# Patient Record
Sex: FEMALE | Race: BLACK OR AFRICAN AMERICAN | NOT HISPANIC OR LATINO | Employment: STUDENT | URBAN - METROPOLITAN AREA
[De-identification: names, ages, dates, MRNs, and addresses within clinical notes are randomized per-mention and may not be internally consistent; named-entity substitution may affect disease eponyms.]

---
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
## 2020-01-02 ENCOUNTER — OFFICE VISIT (OUTPATIENT)
Dept: FAMILY MEDICINE | Facility: CLINIC | Age: 20
End: 2020-01-02
Payer: OTHER GOVERNMENT

## 2020-01-02 ENCOUNTER — LAB VISIT (OUTPATIENT)
Dept: LAB | Facility: HOSPITAL | Age: 20
End: 2020-01-02
Attending: INTERNAL MEDICINE
Payer: OTHER GOVERNMENT

## 2020-01-02 VITALS
DIASTOLIC BLOOD PRESSURE: 82 MMHG | SYSTOLIC BLOOD PRESSURE: 118 MMHG | WEIGHT: 145.06 LBS | OXYGEN SATURATION: 98 % | HEART RATE: 75 BPM | BODY MASS INDEX: 23.41 KG/M2

## 2020-01-02 DIAGNOSIS — Z00.00 ANNUAL PHYSICAL EXAM: ICD-10-CM

## 2020-01-02 DIAGNOSIS — Z00.00 ANNUAL PHYSICAL EXAM: Primary | ICD-10-CM

## 2020-01-02 LAB
BACTERIA #/AREA URNS HPF: ABNORMAL /HPF
BILIRUB UR QL STRIP: NEGATIVE
CLARITY UR: CLEAR
COLOR UR: YELLOW
GLUCOSE UR QL STRIP: NEGATIVE
HGB UR QL STRIP: ABNORMAL
KETONES UR QL STRIP: NEGATIVE
LEUKOCYTE ESTERASE UR QL STRIP: NEGATIVE
MICROSCOPIC COMMENT: ABNORMAL
NITRITE UR QL STRIP: NEGATIVE
PH UR STRIP: 6 [PH] (ref 5–8)
PROT UR QL STRIP: ABNORMAL
RBC #/AREA URNS HPF: 3 /HPF (ref 0–4)
SP GR UR STRIP: >=1.03 (ref 1–1.03)
SQUAMOUS #/AREA URNS HPF: 7 /HPF
URN SPEC COLLECT METH UR: ABNORMAL
WBC #/AREA URNS HPF: 2 /HPF (ref 0–5)

## 2020-01-02 PROCEDURE — 81000 URINALYSIS NONAUTO W/SCOPE: CPT | Mod: PO

## 2020-01-02 PROCEDURE — 99395 PREV VISIT EST AGE 18-39: CPT | Mod: S$PBB,,, | Performed by: INTERNAL MEDICINE

## 2020-01-02 PROCEDURE — 99999 PR PBB SHADOW E&M-EST. PATIENT-LVL II: ICD-10-PCS | Mod: PBBFAC,,, | Performed by: INTERNAL MEDICINE

## 2020-01-02 PROCEDURE — 99212 OFFICE O/P EST SF 10 MIN: CPT | Mod: PBBFAC,PO | Performed by: INTERNAL MEDICINE

## 2020-01-02 PROCEDURE — 99999 PR PBB SHADOW E&M-EST. PATIENT-LVL II: CPT | Mod: PBBFAC,,, | Performed by: INTERNAL MEDICINE

## 2020-01-02 PROCEDURE — 99395 PR PREVENTIVE VISIT,EST,18-39: ICD-10-PCS | Mod: S$PBB,,, | Performed by: INTERNAL MEDICINE

## 2020-01-02 PROCEDURE — 90686 IIV4 VACC NO PRSV 0.5 ML IM: CPT | Mod: PBBFAC,PO

## 2020-01-02 NOTE — PROGRESS NOTES
Subjective:       Patient ID: Shelbi Arciniega is a 19 y.o. female.    Chief Complaint: Annual Exam    Pt here for wellness visit.  Would like a flu shot.  She does not smoke.  She exercises periodically. Never had blood work done.      Review of Systems   Constitutional: Negative for fatigue, fever and unexpected weight change.   HENT: Negative for congestion, postnasal drip, sinus pain and sore throat.    Eyes: Negative for visual disturbance.   Respiratory: Negative for cough, chest tightness, shortness of breath and wheezing.    Cardiovascular: Negative for chest pain, palpitations and leg swelling.   Gastrointestinal: Negative for abdominal pain, blood in stool, constipation, diarrhea, nausea and vomiting.   Endocrine: Negative for cold intolerance and heat intolerance.   Genitourinary: Negative for difficulty urinating, dysuria and frequency.   Musculoskeletal: Negative for back pain, joint swelling and myalgias.   Skin: Negative for rash.   Allergic/Immunologic: Negative for environmental allergies.   Neurological: Negative for dizziness, seizures, numbness and headaches.   Hematological: Does not bruise/bleed easily.   Psychiatric/Behavioral: Negative for agitation and sleep disturbance.       Objective:      Physical Exam   Constitutional: She is oriented to person, place, and time. She appears well-developed and well-nourished.   HENT:   Head: Normocephalic and atraumatic.   Mouth/Throat: Oropharynx is clear and moist.   Eyes: Pupils are equal, round, and reactive to light. Conjunctivae and EOM are normal.   Neck: Normal range of motion. Neck supple. No thyromegaly present.   Cardiovascular: Normal rate, regular rhythm, normal heart sounds and intact distal pulses. Exam reveals no gallop and no friction rub.   No murmur heard.  Pulmonary/Chest: Effort normal and breath sounds normal. No respiratory distress. She has no wheezes. She has no rales.   Abdominal: Soft. Bowel sounds are normal. She  exhibits no distension. There is no tenderness.   Musculoskeletal: Normal range of motion. She exhibits no edema.   Lymphadenopathy:     She has no cervical adenopathy.   Neurological: She is alert and oriented to person, place, and time. No cranial nerve deficit.   Skin: Skin is warm and dry.   Psychiatric: She has a normal mood and affect. Her behavior is normal.       Assessment:       1. Annual physical exam        Plan:       Annual-- check labs . Encouraged exercising

## 2021-01-04 ENCOUNTER — PATIENT MESSAGE (OUTPATIENT)
Dept: ADMINISTRATIVE | Facility: HOSPITAL | Age: 21
End: 2021-01-04

## 2021-04-06 ENCOUNTER — PATIENT MESSAGE (OUTPATIENT)
Dept: ADMINISTRATIVE | Facility: HOSPITAL | Age: 21
End: 2021-04-06

## 2021-05-21 ENCOUNTER — OFFICE VISIT (OUTPATIENT)
Dept: OBSTETRICS AND GYNECOLOGY | Facility: CLINIC | Age: 21
End: 2021-05-21
Payer: OTHER GOVERNMENT

## 2021-05-21 VITALS
WEIGHT: 176.13 LBS | BODY MASS INDEX: 28.43 KG/M2 | DIASTOLIC BLOOD PRESSURE: 90 MMHG | SYSTOLIC BLOOD PRESSURE: 118 MMHG

## 2021-05-21 DIAGNOSIS — Z97.5 IUD (INTRAUTERINE DEVICE) IN PLACE: Primary | ICD-10-CM

## 2021-05-21 DIAGNOSIS — N92.1 BREAKTHROUGH BLEEDING WITH IUD: ICD-10-CM

## 2021-05-21 DIAGNOSIS — Z97.5 BREAKTHROUGH BLEEDING WITH IUD: ICD-10-CM

## 2021-05-21 DIAGNOSIS — Z30.014 ENCOUNTER FOR INITIAL PRESCRIPTION OF INTRAUTERINE CONTRACEPTIVE DEVICE: ICD-10-CM

## 2021-05-21 LAB
B-HCG UR QL: NEGATIVE
CTP QC/QA: YES

## 2021-05-21 PROCEDURE — 99202 PR OFFICE/OUTPT VISIT, NEW, LEVL II, 15-29 MIN: ICD-10-PCS | Mod: S$PBB,,, | Performed by: NURSE PRACTITIONER

## 2021-05-21 PROCEDURE — 99213 OFFICE O/P EST LOW 20 MIN: CPT | Mod: PBBFAC,PN | Performed by: NURSE PRACTITIONER

## 2021-05-21 PROCEDURE — 81025 URINE PREGNANCY TEST: CPT | Mod: PBBFAC,PN | Performed by: NURSE PRACTITIONER

## 2021-05-21 PROCEDURE — 99999 PR PBB SHADOW E&M-EST. PATIENT-LVL III: ICD-10-PCS | Mod: PBBFAC,,, | Performed by: NURSE PRACTITIONER

## 2021-05-21 PROCEDURE — 99202 OFFICE O/P NEW SF 15 MIN: CPT | Mod: S$PBB,,, | Performed by: NURSE PRACTITIONER

## 2021-05-21 PROCEDURE — 99999 PR PBB SHADOW E&M-EST. PATIENT-LVL III: CPT | Mod: PBBFAC,,, | Performed by: NURSE PRACTITIONER

## 2021-05-21 RX ORDER — LEVONORGESTREL 13.5 MG/1
1 INTRAUTERINE DEVICE INTRAUTERINE ONCE
COMMUNITY
End: 2021-07-19

## 2021-05-28 ENCOUNTER — PATIENT MESSAGE (OUTPATIENT)
Dept: OBSTETRICS AND GYNECOLOGY | Facility: CLINIC | Age: 21
End: 2021-05-28

## 2021-06-04 ENCOUNTER — TELEPHONE (OUTPATIENT)
Dept: OBSTETRICS AND GYNECOLOGY | Facility: CLINIC | Age: 21
End: 2021-06-04

## 2021-07-07 ENCOUNTER — PATIENT MESSAGE (OUTPATIENT)
Dept: ADMINISTRATIVE | Facility: HOSPITAL | Age: 21
End: 2021-07-07

## 2021-07-19 ENCOUNTER — PROCEDURE VISIT (OUTPATIENT)
Dept: OBSTETRICS AND GYNECOLOGY | Facility: CLINIC | Age: 21
End: 2021-07-19
Payer: OTHER GOVERNMENT

## 2021-07-19 VITALS
HEIGHT: 66 IN | BODY MASS INDEX: 27.32 KG/M2 | WEIGHT: 170 LBS | DIASTOLIC BLOOD PRESSURE: 82 MMHG | SYSTOLIC BLOOD PRESSURE: 118 MMHG

## 2021-07-19 DIAGNOSIS — Z30.432 ENCOUNTER FOR REMOVAL OF INTRAUTERINE CONTRACEPTIVE DEVICE (IUD): Primary | ICD-10-CM

## 2021-07-19 PROCEDURE — 58301 REMOVE INTRAUTERINE DEVICE: CPT | Mod: PBBFAC,PN | Performed by: NURSE PRACTITIONER

## 2021-07-19 PROCEDURE — 58301 REMOVAL OF IUD: ICD-10-PCS | Mod: S$PBB,,, | Performed by: NURSE PRACTITIONER

## 2021-07-19 PROCEDURE — 58301 REMOVE INTRAUTERINE DEVICE: CPT | Mod: S$PBB,,, | Performed by: NURSE PRACTITIONER

## 2021-10-29 ENCOUNTER — TELEPHONE (OUTPATIENT)
Dept: OBSTETRICS AND GYNECOLOGY | Facility: CLINIC | Age: 21
End: 2021-10-29
Payer: MEDICAID

## 2021-11-08 ENCOUNTER — OFFICE VISIT (OUTPATIENT)
Dept: OBSTETRICS AND GYNECOLOGY | Facility: CLINIC | Age: 21
End: 2021-11-08
Payer: OTHER GOVERNMENT

## 2021-11-08 VITALS
BODY MASS INDEX: 28.06 KG/M2 | HEIGHT: 66 IN | DIASTOLIC BLOOD PRESSURE: 76 MMHG | SYSTOLIC BLOOD PRESSURE: 112 MMHG | WEIGHT: 174.63 LBS

## 2021-11-08 DIAGNOSIS — Z32.01 POSITIVE PREGNANCY TEST: Primary | ICD-10-CM

## 2021-11-08 DIAGNOSIS — O26.849 UTERINE SIZE DATE DISCREPANCY PREGNANCY, UNSPECIFIED TRIMESTER: ICD-10-CM

## 2021-11-08 DIAGNOSIS — Z12.4 CERVICAL CANCER SCREENING: ICD-10-CM

## 2021-11-08 LAB
B-HCG UR QL: POSITIVE
CTP QC/QA: YES

## 2021-11-08 PROCEDURE — 87491 CHLMYD TRACH DNA AMP PROBE: CPT | Performed by: NURSE PRACTITIONER

## 2021-11-08 PROCEDURE — 87591 N.GONORRHOEAE DNA AMP PROB: CPT | Performed by: NURSE PRACTITIONER

## 2021-11-08 PROCEDURE — 88175 CYTOPATH C/V AUTO FLUID REDO: CPT | Performed by: NURSE PRACTITIONER

## 2021-11-08 PROCEDURE — 81025 URINE PREGNANCY TEST: CPT | Mod: PBBFAC,PN | Performed by: NURSE PRACTITIONER

## 2021-11-08 PROCEDURE — 87088 URINE BACTERIA CULTURE: CPT | Performed by: NURSE PRACTITIONER

## 2021-11-08 PROCEDURE — 99212 OFFICE O/P EST SF 10 MIN: CPT | Mod: PBBFAC,PN | Performed by: NURSE PRACTITIONER

## 2021-11-08 PROCEDURE — 87077 CULTURE AEROBIC IDENTIFY: CPT | Performed by: NURSE PRACTITIONER

## 2021-11-08 PROCEDURE — 87086 URINE CULTURE/COLONY COUNT: CPT | Performed by: NURSE PRACTITIONER

## 2021-11-08 PROCEDURE — 99999 PR PBB SHADOW E&M-EST. PATIENT-LVL II: ICD-10-PCS | Mod: PBBFAC,,, | Performed by: NURSE PRACTITIONER

## 2021-11-08 PROCEDURE — 99214 OFFICE O/P EST MOD 30 MIN: CPT | Mod: S$PBB,,, | Performed by: NURSE PRACTITIONER

## 2021-11-08 PROCEDURE — 99214 PR OFFICE/OUTPT VISIT, EST, LEVL IV, 30-39 MIN: ICD-10-PCS | Mod: S$PBB,,, | Performed by: NURSE PRACTITIONER

## 2021-11-08 PROCEDURE — 99999 PR PBB SHADOW E&M-EST. PATIENT-LVL II: CPT | Mod: PBBFAC,,, | Performed by: NURSE PRACTITIONER

## 2021-11-08 PROCEDURE — 87186 SC STD MICRODIL/AGAR DIL: CPT | Performed by: NURSE PRACTITIONER

## 2021-11-09 ENCOUNTER — LAB VISIT (OUTPATIENT)
Dept: LAB | Facility: HOSPITAL | Age: 21
End: 2021-11-09
Attending: NURSE PRACTITIONER
Payer: MEDICAID

## 2021-11-09 DIAGNOSIS — Z32.01 POSITIVE PREGNANCY TEST: ICD-10-CM

## 2021-11-09 LAB
ESTIMATED AVG GLUCOSE: 97 MG/DL (ref 68–131)
HBA1C MFR BLD: 5 % (ref 4–5.6)

## 2021-11-09 PROCEDURE — 86592 SYPHILIS TEST NON-TREP QUAL: CPT | Performed by: NURSE PRACTITIONER

## 2021-11-09 PROCEDURE — 80074 ACUTE HEPATITIS PANEL: CPT | Performed by: NURSE PRACTITIONER

## 2021-11-09 PROCEDURE — 87389 HIV-1 AG W/HIV-1&-2 AB AG IA: CPT | Performed by: NURSE PRACTITIONER

## 2021-11-09 PROCEDURE — 83020 HEMOGLOBIN ELECTROPHORESIS: CPT | Performed by: NURSE PRACTITIONER

## 2021-11-09 PROCEDURE — 85025 COMPLETE CBC W/AUTO DIFF WBC: CPT | Performed by: NURSE PRACTITIONER

## 2021-11-09 PROCEDURE — 86762 RUBELLA ANTIBODY: CPT | Performed by: NURSE PRACTITIONER

## 2021-11-09 PROCEDURE — 83036 HEMOGLOBIN GLYCOSYLATED A1C: CPT | Performed by: NURSE PRACTITIONER

## 2021-11-09 PROCEDURE — 86900 BLOOD TYPING SEROLOGIC ABO: CPT | Performed by: NURSE PRACTITIONER

## 2021-11-10 LAB
ABO + RH BLD: NORMAL
BACTERIA UR CULT: ABNORMAL
BASOPHILS # BLD AUTO: 0.03 K/UL (ref 0–0.2)
BASOPHILS NFR BLD: 0.5 % (ref 0–1.9)
BLD GP AB SCN CELLS X3 SERPL QL: NORMAL
DIFFERENTIAL METHOD: NORMAL
EOSINOPHIL # BLD AUTO: 0.1 K/UL (ref 0–0.5)
EOSINOPHIL NFR BLD: 1.1 % (ref 0–8)
ERYTHROCYTE [DISTWIDTH] IN BLOOD BY AUTOMATED COUNT: 12.1 % (ref 11.5–14.5)
HAV IGM SERPL QL IA: NEGATIVE
HBV CORE IGM SERPL QL IA: NEGATIVE
HBV SURFACE AG SERPL QL IA: NEGATIVE
HCT VFR BLD AUTO: 43.1 % (ref 37–48.5)
HCV AB SERPL QL IA: NEGATIVE
HGB A2 MFR BLD HPLC: 3 % (ref 2.2–3.2)
HGB BLD-MCNC: 14 G/DL (ref 12–16)
HGB FRACT BLD ELPH-IMP: NORMAL
HGB FRACT BLD ELPH-IMP: NORMAL
HIV 1+2 AB+HIV1 P24 AG SERPL QL IA: NEGATIVE
IMM GRANULOCYTES # BLD AUTO: 0 K/UL (ref 0–0.04)
IMM GRANULOCYTES NFR BLD AUTO: 0 % (ref 0–0.5)
LYMPHOCYTES # BLD AUTO: 1.3 K/UL (ref 1–4.8)
LYMPHOCYTES NFR BLD: 21.6 % (ref 18–48)
MCH RBC QN AUTO: 29.3 PG (ref 27–31)
MCHC RBC AUTO-ENTMCNC: 32.5 G/DL (ref 32–36)
MCV RBC AUTO: 90 FL (ref 82–98)
MONOCYTES # BLD AUTO: 0.4 K/UL (ref 0.3–1)
MONOCYTES NFR BLD: 6.6 % (ref 4–15)
NEUTROPHILS # BLD AUTO: 4.3 K/UL (ref 1.8–7.7)
NEUTROPHILS NFR BLD: 70.2 % (ref 38–73)
NRBC BLD-RTO: 0 /100 WBC
PLATELET # BLD AUTO: 276 K/UL (ref 150–450)
PMV BLD AUTO: 10.6 FL (ref 9.2–12.9)
RBC # BLD AUTO: 4.78 M/UL (ref 4–5.4)
RPR SER QL: NORMAL
RUBV IGG SER-ACNC: 14.1 IU/ML
RUBV IGG SER-IMP: REACTIVE
WBC # BLD AUTO: 6.1 K/UL (ref 3.9–12.7)

## 2021-11-11 DIAGNOSIS — N39.0 E-COLI UTI: Primary | ICD-10-CM

## 2021-11-11 DIAGNOSIS — B96.20 E-COLI UTI: Primary | ICD-10-CM

## 2021-11-11 RX ORDER — AMOXICILLIN AND CLAVULANATE POTASSIUM 875; 125 MG/1; MG/1
1 TABLET, FILM COATED ORAL 2 TIMES DAILY
Qty: 14 TABLET | Refills: 0 | Status: SHIPPED | OUTPATIENT
Start: 2021-11-11 | End: 2021-11-17

## 2021-11-12 LAB
C TRACH DNA SPEC QL NAA+PROBE: NOT DETECTED
FINAL PATHOLOGIC DIAGNOSIS: NORMAL
Lab: NORMAL
N GONORRHOEA DNA SPEC QL NAA+PROBE: NOT DETECTED

## 2021-11-15 PROBLEM — N94.6 DYSMENORRHEA IN ADOLESCENT: Status: RESOLVED | Noted: 2017-04-25 | Resolved: 2021-11-15

## 2021-11-15 PROBLEM — B96.89 BV (BACTERIAL VAGINOSIS): Status: ACTIVE | Noted: 2021-11-15

## 2021-11-15 PROBLEM — N76.0 BV (BACTERIAL VAGINOSIS): Status: ACTIVE | Noted: 2021-11-15

## 2021-11-17 ENCOUNTER — PROCEDURE VISIT (OUTPATIENT)
Dept: OBSTETRICS AND GYNECOLOGY | Facility: CLINIC | Age: 21
End: 2021-11-17
Payer: MEDICAID

## 2021-11-17 ENCOUNTER — INITIAL PRENATAL (OUTPATIENT)
Dept: OBSTETRICS AND GYNECOLOGY | Facility: CLINIC | Age: 21
End: 2021-11-17
Payer: MEDICAID

## 2021-11-17 VITALS
DIASTOLIC BLOOD PRESSURE: 78 MMHG | BODY MASS INDEX: 27.67 KG/M2 | SYSTOLIC BLOOD PRESSURE: 140 MMHG | WEIGHT: 171.44 LBS

## 2021-11-17 DIAGNOSIS — O26.849 UTERINE SIZE DATE DISCREPANCY PREGNANCY, UNSPECIFIED TRIMESTER: ICD-10-CM

## 2021-11-17 DIAGNOSIS — Z34.00 PRIMIGRAVIDA, ANTEPARTUM: ICD-10-CM

## 2021-11-17 DIAGNOSIS — B96.20 E. COLI UTI: ICD-10-CM

## 2021-11-17 DIAGNOSIS — Z32.01 POSITIVE PREGNANCY TEST: ICD-10-CM

## 2021-11-17 DIAGNOSIS — N39.0 E. COLI UTI: ICD-10-CM

## 2021-11-17 PROBLEM — Z62.821 BEHAVIOR CAUSING CONCERN IN ADOPTED CHILD: Status: RESOLVED | Noted: 2017-06-06 | Resolved: 2021-11-17

## 2021-11-17 PROCEDURE — 76801 US OB/GYN PROCEDURE (VIEWPOINT): ICD-10-PCS | Mod: 26,S$PBB,, | Performed by: OBSTETRICS & GYNECOLOGY

## 2021-11-17 PROCEDURE — 99213 OFFICE O/P EST LOW 20 MIN: CPT | Mod: PBBFAC,PN,25 | Performed by: ADVANCED PRACTICE MIDWIFE

## 2021-11-17 PROCEDURE — 76801 OB US < 14 WKS SINGLE FETUS: CPT | Mod: PBBFAC,PN | Performed by: OBSTETRICS & GYNECOLOGY

## 2021-11-17 PROCEDURE — 99999 PR PBB SHADOW E&M-EST. PATIENT-LVL III: CPT | Mod: PBBFAC,,, | Performed by: ADVANCED PRACTICE MIDWIFE

## 2021-11-17 PROCEDURE — 99999 PR PBB SHADOW E&M-EST. PATIENT-LVL III: ICD-10-PCS | Mod: PBBFAC,,, | Performed by: ADVANCED PRACTICE MIDWIFE

## 2021-11-17 RX ORDER — NITROFURANTOIN 25; 75 MG/1; MG/1
100 CAPSULE ORAL 2 TIMES DAILY
Qty: 10 CAPSULE | Refills: 0 | Status: SHIPPED | OUTPATIENT
Start: 2021-11-17 | End: 2021-11-22

## 2021-12-22 ENCOUNTER — ROUTINE PRENATAL (OUTPATIENT)
Dept: OBSTETRICS AND GYNECOLOGY | Facility: CLINIC | Age: 21
End: 2021-12-22
Payer: MEDICAID

## 2021-12-22 VITALS
SYSTOLIC BLOOD PRESSURE: 132 MMHG | BODY MASS INDEX: 27.93 KG/M2 | WEIGHT: 173.06 LBS | DIASTOLIC BLOOD PRESSURE: 74 MMHG

## 2021-12-22 DIAGNOSIS — Z34.00 PRIMIGRAVIDA, ANTEPARTUM: Primary | ICD-10-CM

## 2021-12-22 DIAGNOSIS — N76.0 BV (BACTERIAL VAGINOSIS): ICD-10-CM

## 2021-12-22 DIAGNOSIS — B96.89 BV (BACTERIAL VAGINOSIS): ICD-10-CM

## 2021-12-22 PROCEDURE — 99213 PR OFFICE/OUTPT VISIT, EST, LEVL III, 20-29 MIN: ICD-10-PCS | Mod: TH,S$PBB,, | Performed by: ADVANCED PRACTICE MIDWIFE

## 2021-12-22 PROCEDURE — 99213 OFFICE O/P EST LOW 20 MIN: CPT | Mod: TH,S$PBB,, | Performed by: ADVANCED PRACTICE MIDWIFE

## 2021-12-22 PROCEDURE — 99999 PR PBB SHADOW E&M-EST. PATIENT-LVL III: CPT | Mod: PBBFAC,,, | Performed by: ADVANCED PRACTICE MIDWIFE

## 2021-12-22 PROCEDURE — 99999 PR PBB SHADOW E&M-EST. PATIENT-LVL III: ICD-10-PCS | Mod: PBBFAC,,, | Performed by: ADVANCED PRACTICE MIDWIFE

## 2021-12-22 PROCEDURE — 99213 OFFICE O/P EST LOW 20 MIN: CPT | Mod: PBBFAC,TH,PN | Performed by: ADVANCED PRACTICE MIDWIFE

## 2021-12-22 RX ORDER — METRONIDAZOLE 500 MG/1
500 TABLET ORAL EVERY 12 HOURS
Qty: 14 TABLET | Refills: 0 | Status: SHIPPED | OUTPATIENT
Start: 2021-12-22 | End: 2021-12-29

## 2022-01-19 ENCOUNTER — ROUTINE PRENATAL (OUTPATIENT)
Dept: OBSTETRICS AND GYNECOLOGY | Facility: CLINIC | Age: 22
End: 2022-01-19
Payer: MEDICAID

## 2022-01-19 VITALS
SYSTOLIC BLOOD PRESSURE: 130 MMHG | DIASTOLIC BLOOD PRESSURE: 82 MMHG | BODY MASS INDEX: 29.16 KG/M2 | WEIGHT: 180.69 LBS

## 2022-01-19 DIAGNOSIS — Z34.00 PRIMIGRAVIDA, ANTEPARTUM: Primary | ICD-10-CM

## 2022-01-19 PROCEDURE — 99213 OFFICE O/P EST LOW 20 MIN: CPT | Mod: TH,S$PBB,, | Performed by: ADVANCED PRACTICE MIDWIFE

## 2022-01-19 PROCEDURE — 99213 PR OFFICE/OUTPT VISIT, EST, LEVL III, 20-29 MIN: ICD-10-PCS | Mod: TH,S$PBB,, | Performed by: ADVANCED PRACTICE MIDWIFE

## 2022-01-19 PROCEDURE — 99213 OFFICE O/P EST LOW 20 MIN: CPT | Mod: PBBFAC,TH,PN | Performed by: ADVANCED PRACTICE MIDWIFE

## 2022-01-19 PROCEDURE — 99999 PR PBB SHADOW E&M-EST. PATIENT-LVL III: ICD-10-PCS | Mod: PBBFAC,,, | Performed by: ADVANCED PRACTICE MIDWIFE

## 2022-01-19 PROCEDURE — 99999 PR PBB SHADOW E&M-EST. PATIENT-LVL III: CPT | Mod: PBBFAC,,, | Performed by: ADVANCED PRACTICE MIDWIFE

## 2022-01-19 NOTE — PATIENT INSTRUCTIONS
Patient Education       Pregnancy - The Fifth Month   About this topic   It is important for you to learn how to take care of yourself to help you have a healthy baby and safe delivery. It is good to have health care throughout your pregnancy.  The fifth month of your pregnancy starts around week 19 and lasts through week 23. By knowing how far along you are, you can learn what is normal for this stage of your pregnancy and plan for what is next.  General   Growth and Development   During the fifth month of your pregnancy, here are some things you can expect.  You may:  · Start to gain a little more weight. It is normal to gain about 10 to 15 pounds (4.5 to 7 kg) total in your first 5 months.  · Have leg cramps. Be sure to drink plenty of water.  · Have loose teeth.  · Have more trouble breathing or with heartburn as your baby gets bigger  · Start having Alcorn Dozier contractions. You may feel your belly squeezing or getting tight. Be sure to drink 6 to 8 glasses of water each day.  · Notice you are able to express milk from your breasts  · See stretch marks on your belly, breasts, or legs. Stay active to try and keep good muscle tone.  · Be checked for gestational diabetes  Your baby's growth and development:  · Your baby is covered with a thick whitish substance called vernix. This helps protect your babys skin.  · Their genitals are able to be seen on an ultrasound. Your doctor will check your babys size, how much fluid there is around your baby, and all of your babys organs with the ultrasound.  · Your baby is starting to be able to see, hear, taste, and feel touch.  · The bones are starting to make blood cells.  · Your baby is about 11 inches (28 cm) long and weighs about 1 pound (450 gm). Your baby is about the size of a grapefruit.  Things to Think About   · Avoid alcohol, drugs, tobacco products, and second hand smoke  · Do not clean your cat litter box. You could get a disease that causes birth defects  to your baby.  · Check with your doctor before taking any kind of drugs. Continue to take your vitamin with folic acid.  · Do you plan to breastfeed your baby?  · Where will you deliver? Do you plan to take prenatal classes? If so, try to have them completed by your 8th month.  · Start to think about your plans for pain control during labor.  · You may want to learn about cord blood banking.  · Rest and take breaks each day.  When do I need to call the doctor?   · Contractions every 10 minutes or more often that do not go away with drinking water or position changes  · Low, dull back pain that does not go away  · Pressure in your pelvis that feels like your baby is pushing down  · A gush or constant trickle of watery or bloody fluid leaking from your vagina  · Cramps in your lower belly that come and go or are constant  · Little to no movement felt by baby in 2 hours. Your baby should move at least 10 times every 2 hours.  · Headache that does not go away, blurry vision, seeing spots or halos, increase in swelling in your hands, feet, or face, and pain under your ribs on the right side  · Fever of 100.4°F (38°C) or higher  · Bleeding or swelling of your gums  · Urinating less, or having pain when you urinate  · Vaginal bleeding with or without pain  · After a car accident, fall, or any trauma to your belly  · Having thoughts of harming yourself or others, or do not feel safe at home  Where can I learn more?   American Academy of [employer]  https://familydoctor.org/changes-in-your-body-during-pregnancy-second-trimester/   Better Health  https://www.betterhealth.[place].gov.au/health/HealthyLiving/pregnancy-stages-and-changes   Last Reviewed Date   2020-04-20  Consumer Information Use and Disclaimer   This information is not specific medical advice and does not replace information you receive from your health care provider. This is only a brief summary of general information. It does NOT include all information  about conditions, illnesses, injuries, tests, procedures, treatments, therapies, discharge instructions or life-style choices that may apply to you. You must talk with your health care provider for complete information about your health and treatment options. This information should not be used to decide whether or not to accept your health care providers advice, instructions or recommendations. Only your health care provider has the knowledge and training to provide advice that is right for you.  Copyright   Copyright © 2021 [employer] and its affiliates and/or licensors. All rights reserved.

## 2022-01-19 NOTE — PROGRESS NOTES
21 y.o. female  at 17w4d   Not feeling flutters, denies VB, LOF or cramping    Doing well without concerns     TW lbs   Reviewed prenatal labs  Genetic testing declined  Anatomy scan ordered  Flu vaccine recommended and declined  Reviewed warning signs, pregnancy precautions and how/when to call.  RTC x 3 wks, with anatomy ultrasound call or present sooner prn.     I spent a total of 20 minutes on the day of the visit.This includes face to face time and non-face to face time preparing to see the patient (eg, review of tests), Obtaining and/or reviewing separately obtained history, Documenting clinical information in the electronic or other health record, Independently interpreting resultsand communicating results to the patient/family/caregiver, or Care coordination.

## 2022-02-16 ENCOUNTER — INITIAL PRENATAL (OUTPATIENT)
Dept: OBSTETRICS AND GYNECOLOGY | Facility: CLINIC | Age: 22
End: 2022-02-16
Payer: OTHER GOVERNMENT

## 2022-02-16 VITALS
DIASTOLIC BLOOD PRESSURE: 84 MMHG | BODY MASS INDEX: 30.08 KG/M2 | SYSTOLIC BLOOD PRESSURE: 128 MMHG | HEART RATE: 86 BPM | WEIGHT: 186.38 LBS

## 2022-02-16 DIAGNOSIS — Z34.02 PRIMIGRAVIDA IN SECOND TRIMESTER: Primary | ICD-10-CM

## 2022-02-16 PROCEDURE — 90686 IIV4 VACC NO PRSV 0.5 ML IM: CPT | Mod: S$GLB,,, | Performed by: NURSE PRACTITIONER

## 2022-02-16 PROCEDURE — 90471 FLU VACCINE (QUAD) GREATER THAN OR EQUAL TO 3YO PRESERVATIVE FREE IM: ICD-10-PCS | Mod: S$GLB,,, | Performed by: NURSE PRACTITIONER

## 2022-02-16 PROCEDURE — 90471 IMMUNIZATION ADMIN: CPT | Mod: S$GLB,,, | Performed by: NURSE PRACTITIONER

## 2022-02-16 PROCEDURE — 0502F SUBSEQUENT PRENATAL CARE: CPT | Mod: S$GLB,,, | Performed by: NURSE PRACTITIONER

## 2022-02-16 PROCEDURE — 0502F PR SUBSEQUENT PRENATAL CARE: ICD-10-PCS | Mod: S$GLB,,, | Performed by: NURSE PRACTITIONER

## 2022-02-16 PROCEDURE — 90686 FLU VACCINE (QUAD) GREATER THAN OR EQUAL TO 3YO PRESERVATIVE FREE IM: ICD-10-PCS | Mod: S$GLB,,, | Performed by: NURSE PRACTITIONER

## 2022-02-16 NOTE — PROGRESS NOTES
CC: Follow-Up OB    HPI:   21 y.o.  at 21w4d with EDC of 2022, by Ultrasound, here for her follow up OB visit.  Denies any complaints.    Pregnancy ROS:  Positive fetal movement   Negative leakage of fluid   Negative vaginal bleeding   Negative headache, vision changes, RUQ pain, epigastric pain  Negative contractions/abdominal pain   Negative pelvic pressure     Physical Exam:  Prenatal Vitals  BP: 128/84  Weight: 84.5 kg (186 lb 6 oz)  Fetal Heart Rate: 150's    Wt Readings from Last 3 Encounters:   22 84.5 kg (186 lb 6 oz)   22 81.9 kg (180 lb 10.7 oz)   21 78.5 kg (173 lb 1 oz)       Body mass index is 30.08 kg/m².    General: NAD, well developed, well nourished  Psych: alert and oriented to person, time and place, normal affect  HEENT: normocephalic, atraumatic  Abd: Gravid, soft, NT, ND  Skin: warm, dry  Neuro: normal gait, gross motor function intact    No cyanosis, clubbing or edema    FHTs: 152  FH: 2    Maternal Blood Type: O POS Immune, NR    ASSESSMENT: 21 y.o.  @ 21w4d with   1. Primigravida in second trimester         PLAN:  Primigravida in second trimester       Pain, fever, bleeding precautions  Labor, Fetal movement, and Preeclampsia precautions  Cont PNV  Return to clinic in 4 weeks  OBUS tomorrow with Dr. Haider

## 2022-02-16 NOTE — LETTER
February 16, 2022      [facility]) - OB GYN  [address], [address]  [place] [address]  Phone: [phone]  Fax: [phone]       Patient: Shelbi Arciniega   YOB: 2000  Date of Visit: 02/16/2022    To Whom It May Concern:    Mark Arciniega  was at [facility] on 02/16/2022. Please excuse Thaddeus Mcneill for his absence today, 02/16/2022. If you have any questions or concerns, or if I can be of further assistance, please do not hesitate to contact me.    Sincerely,    Tori Clay LPN

## 2022-02-17 ENCOUNTER — PROCEDURE VISIT (OUTPATIENT)
Dept: OBSTETRICS AND GYNECOLOGY | Facility: CLINIC | Age: 22
End: 2022-02-17
Payer: MEDICAID

## 2022-02-17 DIAGNOSIS — Z34.00 PRIMIGRAVIDA, ANTEPARTUM: ICD-10-CM

## 2022-04-01 ENCOUNTER — ROUTINE PRENATAL (OUTPATIENT)
Dept: OBSTETRICS AND GYNECOLOGY | Facility: CLINIC | Age: 22
End: 2022-04-01
Payer: OTHER GOVERNMENT

## 2022-04-01 VITALS
HEART RATE: 106 BPM | WEIGHT: 195.19 LBS | SYSTOLIC BLOOD PRESSURE: 137 MMHG | BODY MASS INDEX: 31.51 KG/M2 | DIASTOLIC BLOOD PRESSURE: 86 MMHG

## 2022-04-01 DIAGNOSIS — Z34.03 PRIMIGRAVIDA IN THIRD TRIMESTER: Primary | ICD-10-CM

## 2022-04-01 PROCEDURE — 0502F SUBSEQUENT PRENATAL CARE: CPT | Mod: S$GLB,,, | Performed by: NURSE PRACTITIONER

## 2022-04-01 PROCEDURE — 0502F PR SUBSEQUENT PRENATAL CARE: ICD-10-PCS | Mod: S$GLB,,, | Performed by: NURSE PRACTITIONER

## 2022-04-01 NOTE — PROGRESS NOTES
CC: Follow-Up OB    HPI:   21 y.o.  at 27w6d with EDC of 2022, by Ultrasound, here for her follow up OB visit.  Denies any complaints. Pt states she fell down a few stairs at home and was admitted to [facility] for Overnight obs    Pregnancy ROS:  Positive fetal movement   Negative leakage of fluid   Negative vaginal bleeding   Negative headache, vision changes, RUQ pain, epigastric pain  Negative contractions/abdominal pain   Negative pelvic pressure     Physical Exam:  Prenatal Vitals  BP: 137/86  Weight: 88.5 kg (195 lb 3.2 oz)  Fundal Height (cm): 26 cm  Fetal Heart Rate: 150  Urine Glucose: Negative  Urine Albumin: Negative    Wt Readings from Last 3 Encounters:   22 88.5 kg (195 lb 3.2 oz)   22 84.5 kg (186 lb 6 oz)   22 81.9 kg (180 lb 10.7 oz)       Body mass index is 31.51 kg/m².    General: NAD, well developed, well nourished  Psych: alert and oriented to person, time and place, normal affect  HEENT: normocephalic, atraumatic  Abd: Gravid, soft, NT, ND  Skin: warm, dry  Neuro: normal gait, gross motor function intact    No cyanosis, clubbing or edema    FHTs: 140  FH: 26    Maternal Blood Type: O POS    ASSESSMENT: 21 y.o.  @ 27w6d with   1. Primigravida in third trimester         PLAN:  Primigravida in third trimester       Pain, fever, bleeding precautions  Labor, Fetal movement, and Preeclampsia precautions  Cont PNV  Return to clinic in 4 weeks

## 2022-05-02 ENCOUNTER — PATIENT MESSAGE (OUTPATIENT)
Dept: OBSTETRICS AND GYNECOLOGY | Facility: CLINIC | Age: 22
End: 2022-05-02

## 2022-05-02 ENCOUNTER — ROUTINE PRENATAL (OUTPATIENT)
Dept: OBSTETRICS AND GYNECOLOGY | Facility: CLINIC | Age: 22
End: 2022-05-02
Payer: OTHER GOVERNMENT

## 2022-05-02 VITALS
DIASTOLIC BLOOD PRESSURE: 88 MMHG | SYSTOLIC BLOOD PRESSURE: 136 MMHG | BODY MASS INDEX: 32.89 KG/M2 | WEIGHT: 203.81 LBS

## 2022-05-02 DIAGNOSIS — Z34.03 PRIMIGRAVIDA IN THIRD TRIMESTER: Primary | ICD-10-CM

## 2022-05-02 LAB
ABS NRBC COUNT: 0 X 10 3/UL (ref 0–0.01)
ABSOLUTE BASOPHIL: 0.01 X 10 3/UL (ref 0–0.22)
ABSOLUTE EOSINOPHIL: 0.1 X 10 3/UL (ref 0.04–0.54)
ABSOLUTE IMMATURE GRAN: 0.03 X 10 3/UL (ref 0–0.04)
ABSOLUTE LYMPHOCYTE: 1.18 X 10 3/UL (ref 0.86–4.75)
ABSOLUTE MONOCYTE: 0.49 X 10 3/UL (ref 0.22–1.08)
ALBUMIN SERPL-MCNC: 3.5 G/DL (ref 3.5–5.2)
ALBUMIN/GLOB SERPL ELPH: 1.2 {RATIO} (ref 1–2.7)
ALP ISOS SERPL LEV INH-CCNC: 128 U/L (ref 35–105)
ALT (SGPT): 8 U/L (ref 0–33)
ANION GAP SERPL CALC-SCNC: 12 MMOL/L (ref 8–17)
AST SERPL-CCNC: 12 U/L (ref 0–32)
BASOPHILS NFR BLD: 0.2 % (ref 0.2–1.2)
BILIRUBIN, TOTAL: 0.34 MG/DL (ref 0–1.2)
BUN/CREAT SERPL: 7.2 (ref 6–20)
CALCIUM SERPL-MCNC: 9.3 MG/DL (ref 8.6–10.2)
CARBON DIOXIDE, CO2: 23 MMOL/L (ref 22–29)
CHLORIDE: 104 MMOL/L (ref 98–107)
CREAT SERPL-MCNC: 0.65 MG/DL (ref 0.5–0.9)
CREATININE RANDOM URINE: 120.8 MG/DL (ref 28–217)
EOSINOPHIL NFR BLD: 1.5 % (ref 0.7–7)
GFR ESTIMATION: 115.06
GLOBULIN: 3 G/DL (ref 1.5–4.5)
GLUCOSE 1 HR POST 50 GM: 115 MG/DL
GLUCOSE: 83 MG/DL (ref 74–106)
HCT VFR BLD AUTO: 40.6 % (ref 37–47)
HGB BLD-MCNC: 13.3 G/DL (ref 12–16)
HIV 1+2 AB+HIV1 P24 AG SERPL QL IA: NONREACTIVE
IMMATURE GRANULOCYTES: 0.5 % (ref 0–0.5)
LDH SERPL L TO P-CCNC: 196 U/L (ref 135–214)
LYMPHOCYTES NFR BLD: 17.8 % (ref 19.3–53.1)
MCH RBC QN AUTO: 28.9 PG (ref 27–32)
MCHC RBC AUTO-ENTMCNC: 32.8 G/DL (ref 32–36)
MCV RBC AUTO: 88.3 FL (ref 82–100)
MONOCYTES NFR BLD: 7.4 % (ref 4.7–12.5)
NEUTROPHILS # BLD AUTO: 4.82 X 10 3/UL (ref 2.15–7.56)
NEUTROPHILS NFR BLD: 72.6 % (ref 34–71.1)
NUCLEATED RED BLOOD CELLS: 0 /100 WBC (ref 0–0.2)
PLATELET # BLD AUTO: 237 X 10 3/UL (ref 135–400)
POTASSIUM: 4.2 MMOL/L (ref 3.5–5.1)
PROT SNV-MCNC: 6.5 G/DL (ref 6.4–8.3)
PROT UR QL STRIP: 12.19 MG/DL (ref 5–24)
PROTEIN/CREATININE RATIO: 0.1
RBC # BLD AUTO: 4.6 X 10 6/UL (ref 4.2–5.4)
RDW-SD: 41.5 FL (ref 37–54)
SODIUM: 139 MMOL/L (ref 136–145)
SYPHILIS TREPONEMAL ANTIBODY: NONREACTIVE
URATE SERPL-MCNC: 5.9 MG/DL (ref 2.4–5.7)
UREA NITROGEN (BUN): 4.7 MG/DL (ref 6–20)
WBC # BLD: 6.63 X 10 3/UL (ref 4.3–10.8)

## 2022-05-02 PROCEDURE — 0502F PR SUBSEQUENT PRENATAL CARE: ICD-10-PCS | Mod: S$GLB,,, | Performed by: NURSE PRACTITIONER

## 2022-05-02 PROCEDURE — 0502F SUBSEQUENT PRENATAL CARE: CPT | Mod: S$GLB,,, | Performed by: NURSE PRACTITIONER

## 2022-05-02 NOTE — PROGRESS NOTES
CC: Follow-Up OB    HPI:   21 y.o.  at 32w2d with EDC of 2022, by Ultrasound, here for her follow up OB visit.  Denies any complaints.    Pregnancy ROS:  Positive fetal movement   Negative leakage of fluid   Negative vaginal bleeding   Negative headache, vision changes, RUQ pain, epigastric pain  Negative contractions/abdominal pain   Negative pelvic pressure     Physical Exam:  Prenatal Vitals  BP: 136/88  Weight: 92.4 kg (203 lb 12.8 oz)  Fetal Heart Rate: 140    Wt Readings from Last 3 Encounters:   22 92.4 kg (203 lb 12.8 oz)   22 88.5 kg (195 lb 3.2 oz)   22 84.5 kg (186 lb 6 oz)       Body mass index is 32.89 kg/m².    General: NAD, well developed, well nourished  Psych: alert and oriented to person, time and place, normal affect  HEENT: normocephalic, atraumatic  Abd: Gravid, soft, NT, ND  Skin: warm, dry  Neuro: normal gait, gross motor function intact    No cyanosis, clubbing or edema in legs noted. DTR's WNL and neg protein check    FHTs: 140  FH: 28    Maternal Blood Type: O POS    ASSESSMENT: 21 y.o.  @ 32w2d with   1. Primigravida in third trimester         PLAN:  Primigravida in third trimester  -     Protein/Creatinine Ratio, Urine  -     Comprehensive Metabolic Panel; Future; Expected date: 2022  -     Uric Acid; Future; Expected date: 2022  -     Lactate Dehydrogenase; Future; Expected date: 2022       Pain, fever, bleeding precautions  Labor, Fetal movement, and Preeclampsia precautions  Cont PNV  Return to clinic in 2 weeks

## 2022-05-18 ENCOUNTER — ROUTINE PRENATAL (OUTPATIENT)
Dept: OBSTETRICS AND GYNECOLOGY | Facility: CLINIC | Age: 22
End: 2022-05-18
Payer: MEDICAID

## 2022-05-18 VITALS
BODY MASS INDEX: 33.73 KG/M2 | SYSTOLIC BLOOD PRESSURE: 154 MMHG | HEART RATE: 96 BPM | DIASTOLIC BLOOD PRESSURE: 106 MMHG | WEIGHT: 209 LBS

## 2022-05-18 DIAGNOSIS — O14.93 PREECLAMPSIA, THIRD TRIMESTER: ICD-10-CM

## 2022-05-18 DIAGNOSIS — O12.13 GESTATIONAL PROTEINURIA IN THIRD TRIMESTER: ICD-10-CM

## 2022-05-18 DIAGNOSIS — Z34.03 PRIMIGRAVIDA IN THIRD TRIMESTER: Primary | ICD-10-CM

## 2022-05-18 LAB
ALBUMIN SERPL BCP-MCNC: 2.4 G/DL (ref 3.4–5)
ALBUMIN/GLOBULIN RATIO: 0.61 RATIO (ref 1.1–1.8)
ALP SERPL-CCNC: 136 U/L (ref 46–116)
ALT SERPL W P-5'-P-CCNC: 11 U/L (ref 12–78)
ANION GAP SERPL CALC-SCNC: 7 MMOL/L (ref 3–11)
APPEARANCE, UA: CLEAR
AST SERPL-CCNC: 14 U/L (ref 15–37)
BACTERIA SPEC CULT: ABNORMAL /HPF
BASOPHILS NFR BLD: 0.3 % (ref 0–3)
BILIRUB SERPL-MCNC: 0.3 MG/DL (ref 0–1)
BILIRUB UR QL STRIP: NEGATIVE MG/DL
BUN SERPL-MCNC: 9 MG/DL (ref 7–18)
BUN/CREAT SERPL: 11.53 RATIO (ref 7–18)
CALCIUM SERPL-MCNC: 9 MG/DL (ref 8.8–10.5)
CHLORIDE SERPL-SCNC: 106 MMOL/L (ref 100–108)
CO2 SERPL-SCNC: 24 MMOL/L (ref 21–32)
COLOR UR: ABNORMAL
CREAT SERPL-MCNC: 0.78 MG/DL (ref 0.55–1.02)
CREATININE, RANDOM URINE: 297 MG/DL (ref 10–175)
EOSINOPHIL NFR BLD: 1.4 % (ref 1–3)
ERYTHROCYTE [DISTWIDTH] IN BLOOD BY AUTOMATED COUNT: 12.8 % (ref 12.5–18)
GFR ESTIMATION: > 60
GLOBULIN: 3.9 G/DL (ref 2.3–3.5)
GLUCOSE (UA): NORMAL MG/DL
GLUCOSE SERPL-MCNC: 77 MG/DL (ref 70–110)
HCT VFR BLD AUTO: 37.4 % (ref 37–47)
HGB BLD-MCNC: 12.8 G/DL (ref 12–16)
HGB UR QL STRIP: 10 /UL
KETONES UR QL STRIP: NEGATIVE MG/DL
LDH SERPL L TO P-CCNC: 219 U/L (ref 82–234)
LEUKOCYTE ESTERASE UR QL STRIP: 25 /UL
LYMPHOCYTES NFR BLD: 19.2 % (ref 25–40)
MCH RBC QN AUTO: 29.6 PG (ref 27–31.2)
MCHC RBC AUTO-ENTMCNC: 34.2 G/DL (ref 31.8–35.4)
MCV RBC AUTO: 86.4 FL (ref 80–97)
MONOCYTES NFR BLD: 11.3 % (ref 1–15)
NEUTROPHILS # BLD AUTO: 4.88 10*3/UL (ref 1.8–7.7)
NEUTROPHILS NFR BLD: 67.4 % (ref 37–80)
NITRITE UR QL STRIP: NEGATIVE
NUCLEATED RED BLOOD CELLS: 0 %
PH UR STRIP: 6 PH (ref 5–9)
PLATELETS: 197 10*3/UL (ref 142–424)
POTASSIUM SERPL-SCNC: 4.2 MMOL/L (ref 3.6–5.2)
PROT SERPL-MCNC: 6.3 G/DL (ref 6.4–8.2)
PROT UR QL STRIP: 30 MG/DL
PROT UR QL STRIP: 72 MG/DL (ref 0–15)
PROTEIN/CREATININE RATIO: 0.24 MG/MG (ref 0.02–0.16)
RBC # BLD AUTO: 4.33 10*6/UL (ref 4.2–5.4)
RBC #/AREA URNS HPF: ABNORMAL /HPF (ref 0–2)
RPR: NON REACTIVE
SERVICE COMMENT 03: ABNORMAL
SODIUM BLD-SCNC: 137 MMOL/L (ref 135–145)
SP GR UR STRIP: 1.02 (ref 1–1.03)
SPECIMEN COLLECTION METHOD, URINE: ABNORMAL
SQUAMOUS EPITHELIAL, UA: ABNORMAL /LPF
URATE SERPL-MCNC: 6.8 MG/DL (ref 2.6–6)
UROBILINOGEN UR STRIP-ACNC: NORMAL MG/DL
WBC # BLD: 7.2 10*3/UL (ref 4.6–10.2)
WBC #/AREA URNS HPF: ABNORMAL /HPF (ref 0–5)

## 2022-05-18 PROCEDURE — 99214 PR OFFICE/OUTPT VISIT, EST, LEVL IV, 30-39 MIN: ICD-10-PCS | Mod: TH,S$GLB,, | Performed by: NURSE PRACTITIONER

## 2022-05-18 PROCEDURE — 99214 OFFICE O/P EST MOD 30 MIN: CPT | Mod: TH,S$GLB,, | Performed by: NURSE PRACTITIONER

## 2022-05-18 NOTE — PROGRESS NOTES
CC: Follow-Up OB    HPI:   21 y.o.  at 34w4d with EDC of 2022, by Ultrasound, here for her follow up OB visit.  Denies any complaints. Noted to have elevated /106, 160/104, with brisk DTR's and 2+ protein on dip, no C/O headaches, some reflux     Pregnancy ROS:  Positive fetal movement   Negative leakage of fluid   Negative vaginal bleeding   Positive headache, vision changes, RUQ pain, epigastric pain  Negative contractions/abdominal pain   Negative pelvic pressure     Physical Exam:  Prenatal Vitals  BP: (!) 154/106  Weight: 94.8 kg (209 lb)  Fundal Height (cm): 32 cm  Fetal Heart Rate: 138  Urine Glucose: Negative  Urine Albumin: 2+    Wt Readings from Last 3 Encounters:   22 94.8 kg (209 lb)   22 92.4 kg (203 lb 12.8 oz)   22 88.5 kg (195 lb 3.2 oz)       Body mass index is 33.73 kg/m².    General: NAD, well developed, well nourished  Psych: alert and oriented to person, time and place, normal affect  HEENT: normocephalic, atraumatic  Abd: Gravid, soft, NT, ND  Skin: warm, dry  Neuro: normal gait, gross motor function intact  No cyanosis, clubbing or edema    FHTs: 138  FH: 32    Maternal Blood Type: O POS    ASSESSMENT: 21 y.o.  @ 34w4d with   No diagnosis found.     PLAN:  There are no diagnoses linked to this encounter.   Pain, fever, bleeding precautions  Labor, Fetal movement, and Preeclampsia precautions  Cont PNV  To L&D now  Call to Dr. Haider

## 2022-05-19 ENCOUNTER — OUTSIDE PLACE OF SERVICE (OUTPATIENT)
Dept: OBSTETRICS AND GYNECOLOGY | Facility: CLINIC | Age: 22
End: 2022-05-19
Payer: MEDICAID

## 2022-05-19 LAB
APTT PPP: 24.2 SEC (ref 22.6–35.4)
BANDS: 2 % (ref 0–5)
CELLS COUNTED: 100
ERYTHROCYTE [DISTWIDTH] IN BLOOD BY AUTOMATED COUNT: 13.2 % (ref 12.5–18)
HCT VFR BLD AUTO: 37.2 % (ref 37–47)
HGB BLD-MCNC: 12.8 G/DL (ref 12–16)
INR PPP: 0.9 INR (ref 0.9–1.1)
LYMPHOCYTES NFR BLD: 6 % (ref 25–40)
MCH RBC QN AUTO: 29.7 PG (ref 27–31.2)
MCHC RBC AUTO-ENTMCNC: 34.4 G/DL (ref 31.8–35.4)
MCV RBC AUTO: 86.3 FL (ref 80–97)
MONOCYTES NFR BLD: 1 % (ref 1–15)
NEUTROPHILS # BLD AUTO: 11.3 10*3/UL (ref 1.8–7.7)
NEUTROPHILS NFR BLD: 91 % (ref 37–80)
NUCLEATED RED BLOOD CELLS: 0 %
PLATELETS: 215 10*3/UL (ref 142–424)
PROTHROMBIN TIME: 9.9 SEC (ref 10.2–12.4)
RBC # BLD AUTO: 4.31 10*6/UL (ref 4.2–5.4)
RBC MORPH BLD: ABNORMAL
SMALL PLATELETS BLD QL SMEAR: ADEQUATE
URINE CULTURE, ROUTINE: NORMAL
WBC # BLD: 12.1 10*3/UL (ref 4.6–10.2)

## 2022-05-19 PROCEDURE — 59514 CESAREAN DELIVERY ONLY: CPT | Mod: AT,,, | Performed by: OBSTETRICS & GYNECOLOGY

## 2022-05-19 PROCEDURE — 59514 PR CESAREAN DELIVERY ONLY: ICD-10-PCS | Mod: AT,,, | Performed by: OBSTETRICS & GYNECOLOGY

## 2022-05-19 RX ORDER — OXYCODONE AND ACETAMINOPHEN 5; 325 MG/1; MG/1
1 TABLET ORAL EVERY 4 HOURS PRN
Qty: 30 TABLET | Refills: 0 | Status: SHIPPED | OUTPATIENT
Start: 2022-05-19 | End: 2022-06-08

## 2022-05-19 RX ORDER — IBUPROFEN 800 MG/1
800 TABLET ORAL EVERY 8 HOURS PRN
Qty: 60 TABLET | Refills: 2 | Status: SHIPPED | OUTPATIENT
Start: 2022-05-19 | End: 2022-06-08

## 2022-05-20 LAB
ERYTHROCYTE [DISTWIDTH] IN BLOOD BY AUTOMATED COUNT: 13.1 % (ref 12.5–18)
HCT VFR BLD AUTO: 34.6 % (ref 37–47)
HGB BLD-MCNC: 11.6 G/DL (ref 12–16)
MCH RBC QN AUTO: 29.5 PG (ref 27–31.2)
MCHC RBC AUTO-ENTMCNC: 33.5 G/DL (ref 31.8–35.4)
MCV RBC AUTO: 88 FL (ref 80–97)
NUCLEATED RED BLOOD CELLS: 0 %
PLATELETS: 210 10*3/UL (ref 142–424)
RBC # BLD AUTO: 3.93 10*6/UL (ref 4.2–5.4)
WBC # BLD: 18.4 10*3/UL (ref 4.6–10.2)

## 2022-05-20 PROCEDURE — 99231 SBSQ HOSP IP/OBS SF/LOW 25: CPT | Mod: ,,, | Performed by: OBSTETRICS & GYNECOLOGY

## 2022-05-20 PROCEDURE — 99231 PR SUBSEQUENT HOSPITAL CARE,LEVL I: ICD-10-PCS | Mod: ,,, | Performed by: OBSTETRICS & GYNECOLOGY

## 2022-05-21 ENCOUNTER — OUTSIDE PLACE OF SERVICE (OUTPATIENT)
Dept: OBSTETRICS AND GYNECOLOGY | Facility: CLINIC | Age: 22
End: 2022-05-21
Payer: MEDICAID

## 2022-05-21 PROCEDURE — 99232 PR SUBSEQUENT HOSPITAL CARE,LEVL II: ICD-10-PCS | Mod: ,,, | Performed by: OBSTETRICS & GYNECOLOGY

## 2022-05-21 PROCEDURE — 99232 SBSQ HOSP IP/OBS MODERATE 35: CPT | Mod: ,,, | Performed by: OBSTETRICS & GYNECOLOGY

## 2022-05-22 ENCOUNTER — OUTSIDE PLACE OF SERVICE (OUTPATIENT)
Dept: OBSTETRICS AND GYNECOLOGY | Facility: CLINIC | Age: 22
End: 2022-05-22
Payer: MEDICAID

## 2022-05-22 PROCEDURE — 99232 SBSQ HOSP IP/OBS MODERATE 35: CPT | Mod: ,,, | Performed by: OBSTETRICS & GYNECOLOGY

## 2022-05-22 PROCEDURE — 99232 PR SUBSEQUENT HOSPITAL CARE,LEVL II: ICD-10-PCS | Mod: ,,, | Performed by: OBSTETRICS & GYNECOLOGY

## 2022-05-23 PROCEDURE — 99238 PR HOSPITAL DISCHARGE DAY,<30 MIN: ICD-10-PCS | Mod: ,,, | Performed by: OBSTETRICS & GYNECOLOGY

## 2022-05-23 PROCEDURE — 99238 HOSP IP/OBS DSCHRG MGMT 30/<: CPT | Mod: ,,, | Performed by: OBSTETRICS & GYNECOLOGY

## 2022-05-24 RX ORDER — LABETALOL 100 MG/1
100 TABLET, FILM COATED ORAL 2 TIMES DAILY
Qty: 60 TABLET | Refills: 11 | Status: SHIPPED | OUTPATIENT
Start: 2022-05-24 | End: 2023-02-06

## 2022-05-24 RX ORDER — NIFEDIPINE 60 MG/1
60 TABLET, EXTENDED RELEASE ORAL DAILY
Qty: 30 TABLET | Refills: 11 | Status: SHIPPED | OUTPATIENT
Start: 2022-05-24 | End: 2023-02-06

## 2022-05-31 ENCOUNTER — PATIENT MESSAGE (OUTPATIENT)
Dept: ADMINISTRATIVE | Facility: HOSPITAL | Age: 22
End: 2022-05-31
Payer: MEDICAID

## 2022-06-08 ENCOUNTER — OFFICE VISIT (OUTPATIENT)
Dept: OBSTETRICS AND GYNECOLOGY | Facility: CLINIC | Age: 22
End: 2022-06-08
Payer: MEDICAID

## 2022-06-08 VITALS
WEIGHT: 182.63 LBS | SYSTOLIC BLOOD PRESSURE: 141 MMHG | BODY MASS INDEX: 29.35 KG/M2 | HEART RATE: 94 BPM | HEIGHT: 66 IN | DIASTOLIC BLOOD PRESSURE: 96 MMHG

## 2022-06-08 DIAGNOSIS — Z98.891 STATUS POST CESAREAN DELIVERY: Primary | ICD-10-CM

## 2022-06-08 PROCEDURE — 99024 POSTOP FOLLOW-UP VISIT: CPT | Mod: S$GLB,,, | Performed by: NURSE PRACTITIONER

## 2022-06-08 PROCEDURE — 1159F MED LIST DOCD IN RCRD: CPT | Mod: CPTII,S$GLB,, | Performed by: NURSE PRACTITIONER

## 2022-06-08 PROCEDURE — 3008F BODY MASS INDEX DOCD: CPT | Mod: CPTII,S$GLB,, | Performed by: NURSE PRACTITIONER

## 2022-06-08 PROCEDURE — 1160F RVW MEDS BY RX/DR IN RCRD: CPT | Mod: CPTII,S$GLB,, | Performed by: NURSE PRACTITIONER

## 2022-06-08 PROCEDURE — 3080F DIAST BP >= 90 MM HG: CPT | Mod: CPTII,S$GLB,, | Performed by: NURSE PRACTITIONER

## 2022-06-08 PROCEDURE — 99024 PR POST-OP FOLLOW-UP VISIT: ICD-10-PCS | Mod: S$GLB,,, | Performed by: NURSE PRACTITIONER

## 2022-06-08 PROCEDURE — 3077F SYST BP >= 140 MM HG: CPT | Mod: CPTII,S$GLB,, | Performed by: NURSE PRACTITIONER

## 2022-06-08 PROCEDURE — 3008F PR BODY MASS INDEX (BMI) DOCUMENTED: ICD-10-PCS | Mod: CPTII,S$GLB,, | Performed by: NURSE PRACTITIONER

## 2022-06-08 PROCEDURE — 1160F PR REVIEW ALL MEDS BY PRESCRIBER/CLIN PHARMACIST DOCUMENTED: ICD-10-PCS | Mod: CPTII,S$GLB,, | Performed by: NURSE PRACTITIONER

## 2022-06-08 PROCEDURE — 3080F PR MOST RECENT DIASTOLIC BLOOD PRESSURE >= 90 MM HG: ICD-10-PCS | Mod: CPTII,S$GLB,, | Performed by: NURSE PRACTITIONER

## 2022-06-08 PROCEDURE — 1159F PR MEDICATION LIST DOCUMENTED IN MEDICAL RECORD: ICD-10-PCS | Mod: CPTII,S$GLB,, | Performed by: NURSE PRACTITIONER

## 2022-06-08 PROCEDURE — 3077F PR MOST RECENT SYSTOLIC BLOOD PRESSURE >= 140 MM HG: ICD-10-PCS | Mod: CPTII,S$GLB,, | Performed by: NURSE PRACTITIONER

## 2022-06-08 NOTE — PROGRESS NOTES
Subjective:       Patient ID: Shelbi Arciniega 21 y.o.     Chief Complaint:  Post op Csection for severe PIH       History of Present Illness  Pt here for wound check/staple removal.  Pt doing well with normal postop complaints.  Not taking her BP meds and having some headaches.          OB History   No data available   PCS for NRFS at 35 wks, LCT female at 4# 1 oz. Did very well.    Review of Systems  Review of Systems   Constitutional: Negative for activity change, appetite change, chills, diaphoresis and fever.   Eyes: Negative for visual disturbance.   Respiratory: Negative for shortness of breath and wheezing.    Cardiovascular: Negative for chest pain.   Gastrointestinal: Negative for blood in stool, constipation, diarrhea, nausea and vomiting.   Genitourinary: Negative for dysuria, hematuria and menorrhagia.   Neurological: Negative for headaches.   Psychiatric/Behavioral: Negative for depression. The patient is not nervous/anxious.    Breast: Negative for lump, skin changes and tenderness          Objective:    Physical Exam:   Constitutional: She appears well-developed and well-nourished. No distress.    HENT:   Head: Normocephalic and atraumatic.      Cardiovascular: Exam reveals no clubbing and no cyanosis.     Pulmonary/Chest: Effort normal. No respiratory distress.        Abdominal: Soft. She exhibits abdominal incision (clean dry and intact). She exhibits no distension. There is no rebound and no guarding.             Musculoskeletal: Normal range of motion and moves all extremeties.        Skin: Skin is warm and dry. She is not diaphoretic. No cyanosis or erythema. Nails show no clubbing.    Psychiatric: She has a normal mood and affect. Her behavior is normal.          Assessment:     SP csection 1 week  Wound check normal  BP evaluation        Plan:      Keep wound clean and dry  Pain bleeding fever precautions given  rtc for 6 wk exam  Agrees to check BP at home and will call if  greater than 160/100

## 2022-12-21 DIAGNOSIS — Z34.91 ENCOUNTER FOR PREGNANCY RELATED EXAMINATION IN FIRST TRIMESTER: Primary | ICD-10-CM

## 2022-12-22 ENCOUNTER — PROCEDURE VISIT (OUTPATIENT)
Dept: OBSTETRICS AND GYNECOLOGY | Facility: CLINIC | Age: 22
End: 2022-12-22
Payer: MEDICAID

## 2022-12-22 DIAGNOSIS — Z34.91 ENCOUNTER FOR PREGNANCY RELATED EXAMINATION IN FIRST TRIMESTER: ICD-10-CM

## 2022-12-22 PROCEDURE — 76801 OB US < 14 WKS SINGLE FETUS: CPT | Mod: S$GLB,,, | Performed by: OBSTETRICS & GYNECOLOGY

## 2022-12-22 PROCEDURE — 76801 US OB/GYN PROCEDURE (VIEWPOINT): ICD-10-PCS | Mod: S$GLB,,, | Performed by: OBSTETRICS & GYNECOLOGY

## 2023-01-12 ENCOUNTER — TELEPHONE (OUTPATIENT)
Dept: OBSTETRICS AND GYNECOLOGY | Facility: CLINIC | Age: 23
End: 2023-01-12

## 2023-01-12 NOTE — TELEPHONE ENCOUNTER
----- Message from Lyndsey Hernandez sent at 1/12/2023  4:01 PM CST -----  Regarding: Appointment for IUD  Contact: patient  Per phone call with the patient she had a baby last year and is wanting to be scheduled for birth control the IUD she was a patient of Mahogany Arias. Please return call [phone].

## 2023-02-06 ENCOUNTER — PATIENT MESSAGE (OUTPATIENT)
Dept: OBSTETRICS AND GYNECOLOGY | Facility: CLINIC | Age: 23
End: 2023-02-06

## 2023-02-06 ENCOUNTER — OFFICE VISIT (OUTPATIENT)
Dept: OBSTETRICS AND GYNECOLOGY | Facility: CLINIC | Age: 23
End: 2023-02-06
Payer: MEDICAID

## 2023-02-06 VITALS
WEIGHT: 200 LBS | BODY MASS INDEX: 32.28 KG/M2 | SYSTOLIC BLOOD PRESSURE: 138 MMHG | DIASTOLIC BLOOD PRESSURE: 85 MMHG | HEART RATE: 90 BPM

## 2023-02-06 DIAGNOSIS — Z30.9 ENCOUNTER FOR CONTRACEPTIVE MANAGEMENT, UNSPECIFIED TYPE: Primary | ICD-10-CM

## 2023-02-06 PROCEDURE — 3075F SYST BP GE 130 - 139MM HG: CPT | Mod: CPTII,S$GLB,, | Performed by: OBSTETRICS & GYNECOLOGY

## 2023-02-06 PROCEDURE — 3075F PR MOST RECENT SYSTOLIC BLOOD PRESS GE 130-139MM HG: ICD-10-PCS | Mod: CPTII,S$GLB,, | Performed by: OBSTETRICS & GYNECOLOGY

## 2023-02-06 PROCEDURE — 3008F BODY MASS INDEX DOCD: CPT | Mod: CPTII,S$GLB,, | Performed by: OBSTETRICS & GYNECOLOGY

## 2023-02-06 PROCEDURE — 99213 OFFICE O/P EST LOW 20 MIN: CPT | Mod: S$GLB,,, | Performed by: OBSTETRICS & GYNECOLOGY

## 2023-02-06 PROCEDURE — 3008F PR BODY MASS INDEX (BMI) DOCUMENTED: ICD-10-PCS | Mod: CPTII,S$GLB,, | Performed by: OBSTETRICS & GYNECOLOGY

## 2023-02-06 PROCEDURE — 3079F DIAST BP 80-89 MM HG: CPT | Mod: CPTII,S$GLB,, | Performed by: OBSTETRICS & GYNECOLOGY

## 2023-02-06 PROCEDURE — 3079F PR MOST RECENT DIASTOLIC BLOOD PRESSURE 80-89 MM HG: ICD-10-PCS | Mod: CPTII,S$GLB,, | Performed by: OBSTETRICS & GYNECOLOGY

## 2023-02-06 PROCEDURE — 99213 PR OFFICE/OUTPT VISIT, EST, LEVL III, 20-29 MIN: ICD-10-PCS | Mod: S$GLB,,, | Performed by: OBSTETRICS & GYNECOLOGY

## 2023-02-06 NOTE — PROGRESS NOTES
Subjective:       Patient ID: Shelbi Arciniega is a 22 y.o. female.    Chief Complaint:  Well Woman      History of Present Illness  HPI  Pt here for contraception.  Had recetn SAB    Wants iud    GYN & OB History  Patient's last menstrual period was 10/02/2022.   Date of Last Pap: 2021    OB History    Para Term  AB Living   2 1 0 1 1 0   SAB IAB Ectopic Multiple Live Births   1 0 0 0 0      # Outcome Date GA Lbr Tristan/2nd Weight Sex Delivery Anes PTL Lv   2 SAB 2022     SAB      1  22 34w5d  1.854 kg (4 lb 1.4 oz) F CS-Unspec  Y        Review of Systems  Review of Systems   Constitutional:  Negative for chills and fever.   Respiratory:  Negative for shortness of breath.    Cardiovascular:  Negative for chest pain.   Gastrointestinal:  Negative for abdominal pain, blood in stool, constipation, diarrhea, nausea, vomiting and reflux.   Genitourinary:  Negative for dysmenorrhea, dyspareunia, dysuria, hematuria, hot flashes, menorrhagia, menstrual problem, pelvic pain, vaginal bleeding, vaginal discharge, postcoital bleeding and vaginal dryness.   Musculoskeletal:  Negative for arthralgias and joint swelling.   Integumentary:  Negative for rash, hair changes, breast mass, nipple discharge and breast skin changes.   Psychiatric/Behavioral:  Negative for depression. The patient is not nervous/anxious.    Breast: Negative for asymmetry, lump, mass, nipple discharge and skin changes        Objective:    Physical Exam:   Constitutional: She appears well-developed and well-nourished. No distress.    HENT:   Head: Normocephalic and atraumatic.    Eyes: Conjunctivae and EOM are normal.    Neck: No tracheal deviation present. No thyromegaly present.    Cardiovascular:       Exam reveals no clubbing, no cyanosis and no edema.        Pulmonary/Chest: Effort normal. No respiratory distress.        Abdominal: Soft. She exhibits no distension and no mass. There is no abdominal  tenderness. There is no rebound and no guarding. No hernia.     Genitourinary:    Vagina, uterus and rectum normal.      Pelvic exam was performed with patient supine.   There is no rash, tenderness, lesion or injury on the right labia. There is no rash, tenderness, lesion or injury on the left labia. Cervix is normal. Right adnexum displays no mass, no tenderness and no fullness. Left adnexum displays no mass, no tenderness and no fullness.                Skin: She is not diaphoretic. No cyanosis. Nails show no clubbing.         Assessment:        1. Encounter for contraceptive management, unspecified type         Gc cz  Iud ordered

## 2023-02-07 ENCOUNTER — PATIENT MESSAGE (OUTPATIENT)
Dept: OBSTETRICS AND GYNECOLOGY | Facility: CLINIC | Age: 23
End: 2023-02-07
Payer: MEDICAID

## 2023-02-07 ENCOUNTER — TELEPHONE (OUTPATIENT)
Dept: OBSTETRICS AND GYNECOLOGY | Facility: CLINIC | Age: 23
End: 2023-02-07
Payer: MEDICAID

## 2023-02-07 NOTE — TELEPHONE ENCOUNTER
Called went straight to , need to get a different number       ----- Message from Janet Urbano sent at 2/7/2023  8:26 AM CST -----  Regarding: school excuse  Contact: pt  Pt states that her school excuse is not in her pt portal. Please call back at [phone]//thank you acc

## 2023-02-08 LAB
CHLAMYDIA: NEGATIVE
GONORRHEA: NEGATIVE
SOURCE: NORMAL
SOURCE: NORMAL
TRICHOMONAS AMPLIFIED: NEGATIVE

## 2023-03-07 ENCOUNTER — PATIENT MESSAGE (OUTPATIENT)
Dept: OBSTETRICS AND GYNECOLOGY | Facility: CLINIC | Age: 23
End: 2023-03-07
Payer: MEDICAID

## 2023-03-23 ENCOUNTER — OFFICE VISIT (OUTPATIENT)
Dept: OBSTETRICS AND GYNECOLOGY | Facility: CLINIC | Age: 23
End: 2023-03-23
Payer: MEDICAID

## 2023-03-23 VITALS
HEART RATE: 89 BPM | BODY MASS INDEX: 32.93 KG/M2 | WEIGHT: 204 LBS | DIASTOLIC BLOOD PRESSURE: 84 MMHG | SYSTOLIC BLOOD PRESSURE: 139 MMHG

## 2023-03-23 DIAGNOSIS — Z30.430 ENCOUNTER FOR INSERTION OF MIRENA IUD: Primary | ICD-10-CM

## 2023-03-23 PROCEDURE — 3008F BODY MASS INDEX DOCD: CPT | Mod: CPTII,S$GLB,, | Performed by: OBSTETRICS & GYNECOLOGY

## 2023-03-23 PROCEDURE — 3079F PR MOST RECENT DIASTOLIC BLOOD PRESSURE 80-89 MM HG: ICD-10-PCS | Mod: CPTII,S$GLB,, | Performed by: OBSTETRICS & GYNECOLOGY

## 2023-03-23 PROCEDURE — 3079F DIAST BP 80-89 MM HG: CPT | Mod: CPTII,S$GLB,, | Performed by: OBSTETRICS & GYNECOLOGY

## 2023-03-23 PROCEDURE — 99499 UNLISTED E&M SERVICE: CPT | Mod: S$GLB,,, | Performed by: OBSTETRICS & GYNECOLOGY

## 2023-03-23 PROCEDURE — 58300 INSERT INTRAUTERINE DEVICE: CPT | Mod: S$GLB,,, | Performed by: OBSTETRICS & GYNECOLOGY

## 2023-03-23 PROCEDURE — 3075F SYST BP GE 130 - 139MM HG: CPT | Mod: CPTII,S$GLB,, | Performed by: OBSTETRICS & GYNECOLOGY

## 2023-03-23 PROCEDURE — 3008F PR BODY MASS INDEX (BMI) DOCUMENTED: ICD-10-PCS | Mod: CPTII,S$GLB,, | Performed by: OBSTETRICS & GYNECOLOGY

## 2023-03-23 PROCEDURE — 58300 INSERTION OF IUD: ICD-10-PCS | Mod: S$GLB,,, | Performed by: OBSTETRICS & GYNECOLOGY

## 2023-03-23 PROCEDURE — 99499 NO LOS: ICD-10-PCS | Mod: S$GLB,,, | Performed by: OBSTETRICS & GYNECOLOGY

## 2023-03-23 PROCEDURE — 3075F PR MOST RECENT SYSTOLIC BLOOD PRESS GE 130-139MM HG: ICD-10-PCS | Mod: CPTII,S$GLB,, | Performed by: OBSTETRICS & GYNECOLOGY

## 2023-03-23 RX ORDER — KETOROLAC TROMETHAMINE 30 MG/ML
15 INJECTION, SOLUTION INTRAMUSCULAR; INTRAVENOUS
Status: COMPLETED | OUTPATIENT
Start: 2023-03-23 | End: 2023-03-23

## 2023-03-23 RX ADMIN — KETOROLAC TROMETHAMINE 15 MG: 30 INJECTION, SOLUTION INTRAMUSCULAR; INTRAVENOUS at 02:03

## 2023-03-23 NOTE — PROCEDURES
Insertion of IUD    Date/Time: 3/23/2023 1:15 PM  Performed by: Willard Rosado MD  Authorized by: Willard Rosado MD     Consent:     Consent obtained:  Verbal    Consent given by:  Patient    Procedure risks and benefits discussed: yes      Patient questions answered: yes      Patient agrees, verbalizes understanding, and wants to proceed: yes      Instructions and paperwork completed: yes    Procedure:     Pelvic exam performed: yes      Negative urine pregnancy test: yes      Cervix cleaned and prepped: yes      Speculum placed in vagina: yes      IUD inserted with no complications: yes    1 Intra Uterine Device levonorgestreL 21 mcg/24 hours (8 yrs) 52 mg     Post-procedure:     Patient tolerated procedure well: yes      Patient will follow up after next period: yes

## 2023-05-08 DIAGNOSIS — Z97.5 IUD (INTRAUTERINE DEVICE) IN PLACE: Primary | ICD-10-CM

## 2023-07-07 ENCOUNTER — OCCUPATIONAL HEALTH (OUTPATIENT)
Dept: URGENT CARE | Facility: CLINIC | Age: 23
End: 2023-07-07

## 2023-07-07 PROCEDURE — 80305 PR DRUG SCREEN - 1: ICD-10-PCS | Mod: S$GLB,,, | Performed by: NURSE PRACTITIONER

## 2023-07-07 PROCEDURE — 80305 DRUG TEST PRSMV DIR OPT OBS: CPT | Mod: S$GLB,,, | Performed by: NURSE PRACTITIONER

## 2024-02-16 ENCOUNTER — TELEPHONE (OUTPATIENT)
Dept: FAMILY MEDICINE | Facility: CLINIC | Age: 24
End: 2024-02-16
Payer: MEDICAID

## 2024-02-16 NOTE — TELEPHONE ENCOUNTER
----- Message from Connie Hdez sent at 2/16/2024  9:24 AM CST -----  Type:   Appointment Request        Name of Caller:pt portal  When is the first available appointment?n/a  Symptoms:I would like just a wellness check up.  Best Call Back Number: [phone]  Additional Information: via pt portal request   Preferred Times: Monday Morning

## 2024-04-15 ENCOUNTER — OFFICE VISIT (OUTPATIENT)
Dept: PRIMARY CARE CLINIC | Facility: CLINIC | Age: 24
End: 2024-04-15
Payer: MEDICAID

## 2024-04-15 VITALS
RESPIRATION RATE: 18 BRPM | SYSTOLIC BLOOD PRESSURE: 137 MMHG | OXYGEN SATURATION: 100 % | HEART RATE: 77 BPM | BODY MASS INDEX: 31.45 KG/M2 | HEIGHT: 66 IN | DIASTOLIC BLOOD PRESSURE: 84 MMHG | WEIGHT: 195.69 LBS | TEMPERATURE: 99 F

## 2024-04-15 DIAGNOSIS — I10 ESSENTIAL HYPERTENSION: ICD-10-CM

## 2024-04-15 DIAGNOSIS — Z00.00 WELL ADULT EXAM: Primary | ICD-10-CM

## 2024-04-15 PROCEDURE — 3079F DIAST BP 80-89 MM HG: CPT | Mod: CPTII,,, | Performed by: FAMILY MEDICINE

## 2024-04-15 PROCEDURE — 99214 OFFICE O/P EST MOD 30 MIN: CPT | Mod: PBBFAC,PN | Performed by: FAMILY MEDICINE

## 2024-04-15 PROCEDURE — 99999 PR PBB SHADOW E&M-EST. PATIENT-LVL IV: CPT | Mod: PBBFAC,,, | Performed by: FAMILY MEDICINE

## 2024-04-15 PROCEDURE — 3008F BODY MASS INDEX DOCD: CPT | Mod: CPTII,,, | Performed by: FAMILY MEDICINE

## 2024-04-15 PROCEDURE — 99385 PREV VISIT NEW AGE 18-39: CPT | Mod: S$PBB,1E,GY, | Performed by: FAMILY MEDICINE

## 2024-04-15 PROCEDURE — 1159F MED LIST DOCD IN RCRD: CPT | Mod: CPTII,,, | Performed by: FAMILY MEDICINE

## 2024-04-15 PROCEDURE — 1160F RVW MEDS BY RX/DR IN RCRD: CPT | Mod: CPTII,,, | Performed by: FAMILY MEDICINE

## 2024-04-15 PROCEDURE — 99203 OFFICE O/P NEW LOW 30 MIN: CPT | Mod: 25,S$PBB,, | Performed by: FAMILY MEDICINE

## 2024-04-15 PROCEDURE — 3075F SYST BP GE 130 - 139MM HG: CPT | Mod: CPTII,,, | Performed by: FAMILY MEDICINE

## 2024-04-15 RX ORDER — HYDROCHLOROTHIAZIDE 12.5 MG/1
12.5 TABLET ORAL DAILY
Qty: 30 TABLET | Refills: 1 | Status: SHIPPED | OUTPATIENT
Start: 2024-04-15 | End: 2025-04-15

## 2024-04-15 RX ORDER — HYDROCHLOROTHIAZIDE 25 MG/1
25 TABLET ORAL DAILY
Qty: 30 TABLET | Refills: 1 | Status: SHIPPED | OUTPATIENT
Start: 2024-04-15 | End: 2024-04-15

## 2024-04-15 NOTE — PROGRESS NOTES
Subjective     Patient ID: Shelbi Arciniega is a 23 y.o. female.    Chief Complaint: Establish Care (Establish care)    HPI:  Patient is a 23 year old female, new to me, here to establish care.  Had pre-eclampsia during pregnancy 2 years ago, and continues to have elevated BP.  Reports weight gain 75 lbs during pregnancy.  Has lost 15 lbs over past 2 years.    Review of Systems   Cardiovascular:  Negative for chest pain, palpitations and leg swelling.   Neurological:  Positive for headaches.          Objective     Physical Exam  Constitutional:       Appearance: She is well-developed.   HENT:      Head: Normocephalic and atraumatic.   Neck:      Thyroid: No thyromegaly.   Cardiovascular:      Rate and Rhythm: Normal rate and regular rhythm.      Heart sounds: Normal heart sounds. No murmur heard.  Pulmonary:      Effort: Pulmonary effort is normal. No respiratory distress.      Breath sounds: Normal breath sounds. No wheezing or rales.   Abdominal:      General: Bowel sounds are normal. There is no distension.      Palpations: Abdomen is soft. There is no mass.      Tenderness: There is no abdominal tenderness.   Musculoskeletal:      Cervical back: Normal range of motion and neck supple.      Right lower leg: No edema.      Left lower leg: No edema.   Lymphadenopathy:      Cervical: No cervical adenopathy.   Skin:     General: Skin is warm and dry.      Findings: No rash.   Neurological:      General: No focal deficit present.      Mental Status: She is alert and oriented to person, place, and time.   Psychiatric:         Mood and Affect: Mood normal.            Assessment and Plan     1. Well adult exam  Encouraged exercise and weight loss.  Long discussion regarding low-sodium diet.  Discuss how stress may be exacerbating her blood pressure.    2. Essential hypertension  Will obtain routine labs.  Trial of hydrochlorothiazide 12.5 mg 1 tablet daily.  Patient is to return to clinic in 4 weeks for blood  pressure recheck.  -     Comprehensive Metabolic Panel; Future; Expected date: 04/15/2024  -     Lipid Panel; Future; Expected date: 04/15/2024  -     Discontinue: hydroCHLOROthiazide (HYDRODIURIL) 25 MG tablet; Take 1 tablet (25 mg total) by mouth once daily.  Dispense: 30 tablet; Refill: 1  -     hydroCHLOROthiazide (HYDRODIURIL) 12.5 MG Tab; Take 1 tablet (12.5 mg total) by mouth once daily.  Dispense: 30 tablet; Refill: 1                 No follow-ups on file.

## 2024-07-09 ENCOUNTER — PATIENT MESSAGE (OUTPATIENT)
Dept: ADMINISTRATIVE | Facility: HOSPITAL | Age: 24
End: 2024-07-09
Payer: MEDICAID

## 2024-09-20 ENCOUNTER — OFFICE VISIT (OUTPATIENT)
Dept: URGENT CARE | Facility: CLINIC | Age: 24
End: 2024-09-20
Payer: MEDICAID

## 2024-09-20 VITALS
TEMPERATURE: 98 F | RESPIRATION RATE: 16 BRPM | SYSTOLIC BLOOD PRESSURE: 127 MMHG | BODY MASS INDEX: 28.93 KG/M2 | WEIGHT: 180 LBS | HEART RATE: 79 BPM | DIASTOLIC BLOOD PRESSURE: 89 MMHG | HEIGHT: 66 IN | OXYGEN SATURATION: 98 %

## 2024-09-20 DIAGNOSIS — L03.011 CELLULITIS OF FINGER OF RIGHT HAND: Primary | ICD-10-CM

## 2024-09-20 RX ORDER — CEPHALEXIN 500 MG/1
500 CAPSULE ORAL EVERY 6 HOURS
Qty: 20 CAPSULE | Refills: 0 | Status: SHIPPED | OUTPATIENT
Start: 2024-09-20 | End: 2024-09-25

## 2024-09-20 RX ORDER — MUPIROCIN 20 MG/G
OINTMENT TOPICAL 2 TIMES DAILY
Qty: 30 G | Refills: 0 | Status: SHIPPED | OUTPATIENT
Start: 2024-09-20 | End: 2024-09-27

## 2024-09-20 NOTE — PROGRESS NOTES
"Subjective:      Patient ID: Shelbi Arciniega is a 23 y.o. female.    Vitals:  height is 5' 6" (1.676 m) and weight is 81.6 kg (180 lb). Her temperature is 97.7 °F (36.5 °C). Her blood pressure is 127/89 and her pulse is 79. Her respiration is 16 and oxygen saturation is 98%.     Chief Complaint: Hand Pain (Right index finger)    Patient is a 23 year old female who presents for wound check to right index finger. She has no PMH. She reports two days ago, she cut it on something, but is unsure what. She states she noticed some mild pain and wanted to have it evaluated. Denied fever or redness.         Constitution: Negative for chills and fever.   HENT:  Negative for sore throat.    Respiratory:  Negative for cough.    Gastrointestinal:  Negative for abdominal pain, nausea, vomiting and diarrhea.   Skin:  Positive for wound. Negative for erythema.      Objective:     Physical Exam   Constitutional: She is oriented to person, place, and time. She appears well-developed.   HENT:   Head: Normocephalic and atraumatic. Head is without abrasion, without contusion and without laceration.   Ears:   Right Ear: External ear normal.   Left Ear: External ear normal.   Nose: Nose normal.   Mouth/Throat: Oropharynx is clear and moist and mucous membranes are normal.   Eyes: EOM and lids are normal.   Neck: Trachea normal and phonation normal. Neck supple.   Pulmonary/Chest: Effort normal.   Musculoskeletal: Normal range of motion.         General: Normal range of motion.      Comments: Small, superficial laceration noted to the right index finger with mild tenderness. Minimal swelling. No induration or fluctuance. Full, passive ROM of the finger.   Neurological: She is alert and oriented to person, place, and time.   Skin: Skin is warm, dry, intact and no rash. Capillary refill takes less than 2 seconds. No abrasion, No burn, No bruising, No erythema and No ecchymosis   Psychiatric: Her speech is normal and behavior is " normal. Judgment and thought content normal.   Nursing note and vitals reviewed.      Assessment:     1. Cellulitis of finger of right hand        Plan:       Cellulitis of finger of right hand  -     mupirocin (BACTROBAN) 2 % ointment; Apply topically 2 (two) times daily. for 7 days  Dispense: 30 g; Refill: 0  -     cephALEXin (KEFLEX) 500 MG capsule; Take 1 capsule (500 mg total) by mouth every 6 (six) hours. for 5 days  Dispense: 20 capsule; Refill: 0          Medical Decision Making:   History:   Old Medical Records: I decided to obtain old medical records.  Urgent Care Management:  Patient denied chance of pregnancy. Declined UPT. Aware of risk that medications being prescribed can have adverse effect in pregnancy.    Mild tenderness. Discussed topical antibiotic ointment and oral antibiotics if symptoms persist.  No tendosynovitis .

## 2024-09-20 NOTE — PATIENT INSTRUCTIONS
Keep the wound clean. Use topical antibiotic ointment. If it worsens or doesn't improve in the next few days, start the oral antibiotics.

## 2024-11-03 ENCOUNTER — OCCUPATIONAL HEALTH (OUTPATIENT)
Dept: URGENT CARE | Facility: CLINIC | Age: 24
End: 2024-11-03

## 2024-11-03 PROCEDURE — 80305 DRUG TEST PRSMV DIR OPT OBS: CPT | Mod: S$GLB,,, | Performed by: EMERGENCY MEDICINE

## 2025-05-15 ENCOUNTER — OCCUPATIONAL HEALTH (OUTPATIENT)
Dept: URGENT CARE | Facility: CLINIC | Age: 25
End: 2025-05-15

## 2025-05-28 ENCOUNTER — PATIENT OUTREACH (OUTPATIENT)
Dept: ADMINISTRATIVE | Facility: HOSPITAL | Age: 25
End: 2025-05-28
Payer: MEDICAID

## 2025-07-15 ENCOUNTER — PATIENT MESSAGE (OUTPATIENT)
Dept: PRIMARY CARE CLINIC | Facility: CLINIC | Age: 25
End: 2025-07-15
Payer: MEDICAID